# Patient Record
(demographics unavailable — no encounter records)

---

## 2025-02-07 NOTE — REASON FOR VISIT
[Symptom and Test Evaluation] : symptom and test evaluation [FreeTextEntry1] : 57M comes in for a follow up visit. No chest pain noted. Occasional dyspnea and fatigue noted.  No issues with medications.

## 2025-02-07 NOTE — DISCUSSION/SUMMARY
[FreeTextEntry1] : SOB/fatigue check echo and carotid us if able to approve and schedule. HTN - FREDY  and I had an extensive discussion regarding his blood pressure management. Patient will continue taking current medications in addition to maintaining a low Na diet, with periodic b/p checks at home. refills sent HLD cont statin, zetia and omega 3, labs reviewed, refills sent CAD cont ASA, ccta reviewed.  EKG section of the chart --- secondary to symptoms above an electrocardiogram also known as an EKG was performed.  Risks and benefits discussed with the patient. Patient was given time and privacy to changed into a gown. Shortly after, standard 10 leads were applied and a TRADE TO REBATE system was used to perform the study. The results were subsequently reviewed by attending physician and discussed with the patient. The study showed a normal sinus rhythm and no ST-T suggestive of ischemia. Order for the EKG was placed in the chart. The results were documented. Billing submitted. [EKG obtained to assist in diagnosis and management of assessed problem(s)] : EKG obtained to assist in diagnosis and management of assessed problem(s)